# Patient Record
Sex: FEMALE | Race: WHITE | Employment: STUDENT | URBAN - METROPOLITAN AREA
[De-identification: names, ages, dates, MRNs, and addresses within clinical notes are randomized per-mention and may not be internally consistent; named-entity substitution may affect disease eponyms.]

---

## 2021-04-18 ENCOUNTER — HOSPITAL ENCOUNTER (EMERGENCY)
Age: 21
Discharge: HOME | End: 2021-04-18
Attending: EMERGENCY MEDICINE
Payer: COMMERCIAL

## 2021-04-18 VITALS
HEIGHT: 70 IN | SYSTOLIC BLOOD PRESSURE: 134 MMHG | BODY MASS INDEX: 21.82 KG/M2 | OXYGEN SATURATION: 99 % | WEIGHT: 152.4 LBS | RESPIRATION RATE: 16 BRPM | TEMPERATURE: 97.8 F | HEART RATE: 75 BPM | DIASTOLIC BLOOD PRESSURE: 83 MMHG

## 2021-04-18 DIAGNOSIS — M79.661 RIGHT CALF PAIN: Primary | ICD-10-CM

## 2021-04-18 LAB — D DIMER: <200 NG/ML DDU (ref 0–229)

## 2021-04-18 PROCEDURE — 85379 FIBRIN DEGRADATION QUANT: CPT

## 2021-04-18 PROCEDURE — 99282 EMERGENCY DEPT VISIT SF MDM: CPT

## 2021-04-18 ASSESSMENT — PAIN DESCRIPTION - LOCATION: LOCATION: LEG

## 2021-04-18 ASSESSMENT — PAIN DESCRIPTION - PAIN TYPE: TYPE: ACUTE PAIN

## 2021-04-18 ASSESSMENT — PAIN DESCRIPTION - ORIENTATION: ORIENTATION: LOWER;RIGHT

## 2021-04-18 ASSESSMENT — PAIN SCALES - GENERAL: PAINLEVEL_OUTOF10: 4

## 2021-04-18 NOTE — ED PROVIDER NOTES
TRIAGE CHIEF COMPLAINT:   Chief Complaint   Patient presents with    Leg Pain         HPI: Ramandeep Hdez is a 24 y.o. female who presents to the Emergency Department with complaint of right lower leg pain/tightness that started yesterday. She is worried about a DVT. The pain is on the posterior lateral aspect of her right calf. She has no knee pain or thigh pain. Denies chest pain or shortness of breath. No known injury. She has never had a blood clot. The patient did get the J&J vaccine about 10 days ago and is concerned for clot because of the injection. She also was on birth control pills. She does not smoke. No other risk factors for clotting. REVIEW OF SYSTEMS:  6 systems reviewed. Pertinent positives per HPI. Otherwise noted to be negative. Nursing notes reviewed and agree with above. Past medical/surgical history reviewed. MEDICATIONS   Patient's Medications   New Prescriptions    No medications on file   Previous Medications    DROSPIRENONE-ETHINYL ESTRADIOL (EDD PO)    Take by mouth   Modified Medications    No medications on file   Discontinued Medications    No medications on file         ALLERGIES No Known Allergies      /83   Pulse 75   Temp 97.8 °F (36.6 °C) (Oral)   Resp 16   Ht 5' 10\" (1.778 m)   Wt 152 lb 6.4 oz (69.1 kg)   LMP 04/09/2021   SpO2 99%   BMI 21.87 kg/m²   General:  No acute distress. Non toxic appearance  Head:   Normocephalic and atraumatic  Eyes:   Conjunctiva clear, MITZY, EOM's intact. Sclera anicteric. ENT:   Mucous membranes moist  Neck:   Supple. No adenopathy or jugular venous distension  Lungs/Chest:  No respiratory distress  CVS:   Regular rate and rhythm  Abdomen:  Deferred  Extremities:  Full range of motion. There is no obvious swelling of either leg. She has some very mild tenderness in the posterior lateral aspect of the right calf. No palpable cord. Equivocal positive [place] Dilling' sign on the right. No popliteal tenderness.   No knee swelling or laxity. No redness of the right lower leg. She has an old birthmark on the left knee. Range of motion is 0 to 140 degrees bilaterally. Both feet are warm. Pedal pulses are 3+ and equal.  Normal sensation distally. Skin:   No rashes or lesions to exposed skin  Back:   Deferred  Neuro:  Alert and OX3. Speech clear and appropriate. No upper/lower extremity weakness. Normal sensation in all extremities. No facial asymmetry or weakness. Gait normal.  Psych:   Affect normal. Mood normal        RADIOLOGY:      LAB  Labs Reviewed   D-DIMER, QUANTITATIVE    Narrative:     Performed at:  [facility]  [address],  [place], [address]   Phone [phone]       ED COURSE / MDM:  48-year-old female presents complaining of right lower leg pain/tightness in the posterior lateral aspect of the right calf which started yesterday. No known injury. She had a J&J vaccine 10 days ago and is on birth control pills. She has never had a blood clot. She does not smoke. No chest pain or shortness of breath. I explained to her that I am unable to do a venous Doppler study here or at any of the other hospitals on Sunday. I recommended a D-dimer. D-dimer was normal. I explained to the patient I feel the likelihood of DVT is small but that she still should get the venous Doppler study tomorrow. Clinically there is a higher likelihood of a muscle strain. Recommended rest and ice to the area. Advised Tylenol or ibuprofen if needed for pain. She was given a prescription for venous Doppler study of the right leg. Advise follow-up with her primary care doctor. I discussed with Kaylee Woodruff the results of the evaluation in the Emergency Department, diagnosis, care, prognosis and the importance of follow-up. The patient is stable for discharge. The patient and/or family are in agreement with the plan and all questions have been answered.   Specific

## 2021-04-18 NOTE — ED TRIAGE NOTES
Pt c/o right calf pain since yesterday. Notes pain to calf specifically when flexing foot upwards and noticed while pushing car pedals during driving. Takes oral BCPs and had J&J covid vaccine on 4/10/2021.